# Patient Record
Sex: MALE
[De-identification: names, ages, dates, MRNs, and addresses within clinical notes are randomized per-mention and may not be internally consistent; named-entity substitution may affect disease eponyms.]

---

## 2024-06-05 ENCOUNTER — APPOINTMENT (OUTPATIENT)
Dept: ORTHOPEDIC SURGERY | Facility: CLINIC | Age: 69
End: 2024-06-05

## 2024-06-05 ENCOUNTER — APPOINTMENT (OUTPATIENT)
Dept: ORTHOPEDIC SURGERY | Facility: CLINIC | Age: 69
End: 2024-06-05
Payer: COMMERCIAL

## 2024-06-05 PROBLEM — Z00.00 ENCOUNTER FOR PREVENTIVE HEALTH EXAMINATION: Status: ACTIVE | Noted: 2024-06-05

## 2024-06-05 PROCEDURE — 99203 OFFICE O/P NEW LOW 30 MIN: CPT | Mod: 57

## 2024-06-05 PROCEDURE — 73080 X-RAY EXAM OF ELBOW: CPT | Mod: LT

## 2024-06-05 PROCEDURE — 24650 CLTX RDL HEAD/NCK FX WO MNPJ: CPT

## 2024-06-05 NOTE — IMAGING
[de-identified] : Left elbow Moderately swollen No ecchymosis or wounds Normal muscle tone/ bulk TTP at the radial head/neck Limited ROM: Flexes to 90 degrees, extends to 30 degrees short of full Painful pronosupination No mechanical blocks to motion Full symmetric wrist ROM Able to make a full composite fist +AIN/ PIN/ Ulnar n SILT throughout fingers wwp  3 views left elbow: Nondisplaced radial head fracture

## 2024-06-05 NOTE — HISTORY OF PRESENT ILLNESS
[de-identified] : 06/05/2024 SHY NGUYEN is a 69 year old male here today for: the left elbow  Location: Left elbow Complaint: The patient presents the office today for evaluation of left elbow pain.  The patient notes a mechanical fall while playing [employer] on 6/4/2024, landing directly on his left elbow.  He noted immediate onset of pain and limited range of motion.  Today he continues to note mild pain but denies numbness and tingling.  Of note, the patient has plans to travel to [place] tomorrow. Symptom onset: 6/4/2024 Prior treatments: none  Hand Dominance: right  Occupation:   PMH: none  Allergies: none

## 2024-06-05 NOTE — DISCUSSION/SUMMARY
[de-identified] : - reviewed the nature of this injury and prognosis with the patient and his wife in layman's terms - discussed indications for both operative and nonoperative treatment - reviewed conservative options including the role for bracing, anti-inflammatory medications and therapy - reviewed risks, benefits and alternatives to these - activity modifications, patient instructed on gentle range of motion exercises - sling for comfort - NSAIDs as needed for pain - f/u in 2 weeks for repeat x-rays, may consider PT at that time

## 2024-06-19 ENCOUNTER — APPOINTMENT (OUTPATIENT)
Dept: ORTHOPEDIC SURGERY | Facility: CLINIC | Age: 69
End: 2024-06-19
Payer: COMMERCIAL

## 2024-06-19 DIAGNOSIS — S52.122A DISPLACED FRACTURE OF HEAD OF LEFT RADIUS, INITIAL ENCOUNTER FOR CLOSED FRACTURE: ICD-10-CM

## 2024-06-19 PROCEDURE — 73080 X-RAY EXAM OF ELBOW: CPT | Mod: LT

## 2024-06-19 PROCEDURE — 99024 POSTOP FOLLOW-UP VISIT: CPT

## 2024-06-19 NOTE — HISTORY OF PRESENT ILLNESS
[de-identified] : 6/19/24: The patient returns today for repeat evaluation of his left radial head fracture.  He has been working on his gentle range of motion but remains somewhat stiff.  He returns from his trip and notes that he was bike [place] the countryside in [place].  No new injuries, he has mild pain.  06/05/2024 SHY NGUYEN is a 69 year old male here today for: the left elbow Location: Left elbow Complaint: The patient presents the office today for evaluation of left elbow pain. The patient notes a mechanical fall while playing [employer] on 6/4/2024, landing directly on his left elbow. He noted immediate onset of pain and limited range of motion. Today he continues to note mild pain but denies numbness and tingling. Of note, the patient has plans to travel to [place] tomorrow. Symptom onset: 6/4/2024 Prior treatments: none Hand Dominance: right Occupation:  PMH: none Allergies: none

## 2024-06-19 NOTE — DISCUSSION/SUMMARY
[de-identified] : - again reviewed the nature of this injury and prognosis with the patient and his wife in layman's terms - discussed indications for both operative and nonoperative treatment - reviewed conservative options including the role for bracing, anti-inflammatory medications and therapy - reviewed risks, benefits and alternatives to these - activity modifications, continue gentle range of motion exercises - NSAIDs as needed for pain - PT Rx provided today - f/u in 2 weeks for motion check

## 2024-06-19 NOTE — IMAGING
[de-identified] : Left elbow Mildly swollen No ecchymosis or wounds Normal muscle tone/ bulk TTP at the radial head/neck Limited ROM: Flexes to 110 degrees, extends to 30 degrees short of full Painful pronosupination No mechanical blocks to motion Full symmetric wrist ROM Able to make a full composite fist +AIN/ PIN/ Ulnar n SILT throughout fingers wwp  3 views left elbow: Nondisplaced radial head fracture, no interval displacement

## 2024-07-02 ENCOUNTER — RX RENEWAL (OUTPATIENT)
Age: 69
End: 2024-07-02

## 2024-07-03 ENCOUNTER — APPOINTMENT (OUTPATIENT)
Dept: ORTHOPEDIC SURGERY | Facility: CLINIC | Age: 69
End: 2024-07-03
Payer: COMMERCIAL

## 2024-07-03 DIAGNOSIS — S52.122A DISPLACED FRACTURE OF HEAD OF LEFT RADIUS, INITIAL ENCOUNTER FOR CLOSED FRACTURE: ICD-10-CM

## 2024-07-03 PROCEDURE — 99024 POSTOP FOLLOW-UP VISIT: CPT

## 2024-07-03 PROCEDURE — 73080 X-RAY EXAM OF ELBOW: CPT | Mod: LT

## 2024-08-07 ENCOUNTER — APPOINTMENT (OUTPATIENT)
Dept: ORTHOPEDIC SURGERY | Facility: CLINIC | Age: 69
End: 2024-08-07

## 2024-09-17 ENCOUNTER — APPOINTMENT (OUTPATIENT)
Dept: ORTHOPEDIC SURGERY | Facility: CLINIC | Age: 69
End: 2024-09-17
Payer: COMMERCIAL

## 2024-09-17 DIAGNOSIS — S52.122A DISPLACED FRACTURE OF HEAD OF LEFT RADIUS, INITIAL ENCOUNTER FOR CLOSED FRACTURE: ICD-10-CM

## 2024-09-17 DIAGNOSIS — M16.11 UNILATERAL PRIMARY OSTEOARTHRITIS, RIGHT HIP: ICD-10-CM

## 2024-09-17 DIAGNOSIS — R10.30 LOWER ABDOMINAL PAIN, UNSPECIFIED: ICD-10-CM

## 2024-09-17 DIAGNOSIS — Z00.00 ENCOUNTER FOR GENERAL ADULT MEDICAL EXAMINATION W/OUT ABNORMAL FINDINGS: ICD-10-CM

## 2024-09-17 PROCEDURE — 73503 X-RAY EXAM HIP UNI 4/> VIEWS: CPT | Mod: RT

## 2024-09-17 PROCEDURE — 99214 OFFICE O/P EST MOD 30 MIN: CPT

## 2024-09-17 PROCEDURE — 73080 X-RAY EXAM OF ELBOW: CPT | Mod: LT

## 2024-09-18 PROBLEM — R10.30 DEEP GROIN PAIN: Noted: 2024-09-17

## 2024-09-18 PROBLEM — M16.11 PRIMARY OSTEOARTHRITIS OF RIGHT HIP: Status: ACTIVE | Noted: 2024-09-18

## 2024-09-18 NOTE — HISTORY OF PRESENT ILLNESS
[de-identified] : 9/17/24: The patient returns today for repeat evaluation of his left radial head fracture.  He has been working with physical therapy and notes significant gains in his range of motion despite some persistent pain and a crunching sensation.  As an aside he notes right groin pain which is associated with activities.  No new injuries, numbness or tingling.  06/05/2024 SHY NGUYEN is a 69 year old male here today for: the left elbow Location: Left elbow Complaint: The patient presents the office today for evaluation of left elbow pain. The patient notes a mechanical fall while playing [email or web address] on 6/4/2024, landing directly on his left elbow. He noted immediate onset of pain and limited range of motion. Today he continues to note mild pain but denies numbness and tingling. Of note, the patient has plans to travel to [place] tomorrow. Symptom onset: 6/4/2024 Prior treatments: none Hand Dominance: right Occupation:  PMH: none Allergies: none

## 2024-09-18 NOTE — IMAGING
[AP] : anteroposterior [Lateral] : lateral [There are no fractures, subluxations or dislocations. No significant abnormalities are seen] : There are no fractures, subluxations or dislocations. No significant abnormalities are seen [Moderate arthritis (Tonnis Grade 2)] : Moderate arthritis (Tonnis Grade 2) [de-identified] : Left elbow No ecchymosis, swelling or wounds Normal muscle tone/ bulk Minimally TTP at the radial head/neck Limited ROM: Flexes to 140 degrees, extends to 20 degrees short of full Symmetric pronosupination  No mechanical blocks to motion No instability Full symmetric wrist ROM Able to make a full composite fist +AIN/ PIN/ Ulnar n SILT throughout fingers wwp  3 views left elbow: Nondisplaced radial head fracture, no interval displacement

## 2024-09-18 NOTE — DISCUSSION/SUMMARY
[de-identified] : Reviewed the nature of all of these findings with the patient and likely etiologies and layman's terms MRI left elbow to evaluate annular and lateral collateral ligaments Right hip radiographs reviewed with the patient -primary osteoarthritis Activity modifications NSAIDs as needed for pain Follow-up with Dr. Smith for right hip Follow-up after MRI

## 2024-09-18 NOTE — HISTORY OF PRESENT ILLNESS
[de-identified] : 9/17/24: The patient returns today for repeat evaluation of his left radial head fracture.  He has been working with physical therapy and notes significant gains in his range of motion despite some persistent pain and a crunching sensation.  As an aside he notes right groin pain which is associated with activities.  No new injuries, numbness or tingling.  06/05/2024 SHY NGUYEN is a 69 year old male here today for: the left elbow Location: Left elbow Complaint: The patient presents the office today for evaluation of left elbow pain. The patient notes a mechanical fall while playing [employer] on 6/4/2024, landing directly on his left elbow. He noted immediate onset of pain and limited range of motion. Today he continues to note mild pain but denies numbness and tingling. Of note, the patient has plans to travel to [place] tomorrow. Symptom onset: 6/4/2024 Prior treatments: none Hand Dominance: right Occupation:  PMH: none Allergies: none

## 2024-09-18 NOTE — IMAGING
[AP] : anteroposterior [Lateral] : lateral [There are no fractures, subluxations or dislocations. No significant abnormalities are seen] : There are no fractures, subluxations or dislocations. No significant abnormalities are seen [Moderate arthritis (Tonnis Grade 2)] : Moderate arthritis (Tonnis Grade 2) [de-identified] : Left elbow No ecchymosis, swelling or wounds Normal muscle tone/ bulk Minimally TTP at the radial head/neck Limited ROM: Flexes to 140 degrees, extends to 20 degrees short of full Symmetric pronosupination  No mechanical blocks to motion No instability Full symmetric wrist ROM Able to make a full composite fist +AIN/ PIN/ Ulnar n SILT throughout fingers wwp  3 views left elbow: Nondisplaced radial head fracture, no interval displacement

## 2024-09-18 NOTE — DISCUSSION/SUMMARY
[de-identified] : Reviewed the nature of all of these findings with the patient and likely etiologies and layman's terms MRI left elbow to evaluate annular and lateral collateral ligaments Right hip radiographs reviewed with the patient -primary osteoarthritis Activity modifications NSAIDs as needed for pain Follow-up with Dr. Smith for right hip Follow-up after MRI

## 2024-09-20 ENCOUNTER — APPOINTMENT (OUTPATIENT)
Dept: MRI IMAGING | Facility: CLINIC | Age: 69
End: 2024-09-20

## 2024-09-20 PROCEDURE — 73221 MRI JOINT UPR EXTREM W/O DYE: CPT | Mod: LT

## 2024-10-01 ENCOUNTER — APPOINTMENT (OUTPATIENT)
Dept: ORTHOPEDIC SURGERY | Facility: CLINIC | Age: 69
End: 2024-10-01
Payer: COMMERCIAL

## 2024-10-01 DIAGNOSIS — M25.322 OTHER INSTABILITY, LEFT ELBOW: ICD-10-CM

## 2024-10-01 DIAGNOSIS — S52.122A DISPLACED FRACTURE OF HEAD OF LEFT RADIUS, INITIAL ENCOUNTER FOR CLOSED FRACTURE: ICD-10-CM

## 2024-10-01 PROCEDURE — 99214 OFFICE O/P EST MOD 30 MIN: CPT

## 2024-10-07 PROBLEM — M25.322 INSTABILITY OF LEFT ELBOW JOINT: Status: ACTIVE | Noted: 2024-10-07

## 2024-10-07 NOTE — HISTORY OF PRESENT ILLNESS
[de-identified] : 9/17/24: The patient returns today for repeat evaluation of his left radial head fracture treated nonoperatively.  He has been working with physical therapy and continues to note small gains in his range of motion despite some persistent pain and a crunching sensation. No new injuries, numbness or tingling.  He has an MRI available for review today.  06/05/2024 SHY NGUYEN is a 69 year old male here today for: the left elbow Location: Left elbow Complaint: The patient presents the office today for evaluation of left elbow pain. The patient notes a mechanical fall while playing [employer] on 6/4/2024, landing directly on his left elbow. He noted immediate onset of pain and limited range of motion. Today he continues to note mild pain but denies numbness and tingling. Of note, the patient has plans to travel to [place] tomorrow. Symptom onset: 6/4/2024 Prior treatments: none Hand Dominance: right Occupation:  PMH: none Allergies: none

## 2024-10-07 NOTE — HISTORY OF PRESENT ILLNESS
[de-identified] : 9/17/24: The patient returns today for repeat evaluation of his left radial head fracture treated nonoperatively.  He has been working with physical therapy and continues to note small gains in his range of motion despite some persistent pain and a crunching sensation. No new injuries, numbness or tingling.  He has an MRI available for review today.  06/05/2024 SHY NGUYEN is a 69 year old male here today for: the left elbow Location: Left elbow Complaint: The patient presents the office today for evaluation of left elbow pain. The patient notes a mechanical fall while playing [employer] on 6/4/2024, landing directly on his left elbow. He noted immediate onset of pain and limited range of motion. Today he continues to note mild pain but denies numbness and tingling. Of note, the patient has plans to travel to [place] tomorrow. Symptom onset: 6/4/2024 Prior treatments: none Hand Dominance: right Occupation:  PMH: none Allergies: none

## 2024-10-07 NOTE — DISCUSSION/SUMMARY
[de-identified] : Reviewed the nature of all of these findings with the patient and etiology in layman's terms MRI images and findings reviewed in depth Reviewed indications for operative and nonoperative treatments Reviewed nonoperative treatments including the role for anti-inflammatory medications, bracing and therapy Reviewed operative procedure including LUCL reconstruction and the postoperative expectations At this time he would like to seek a second opinion which I agree is reasonable Activity modifications NSAIDs as needed for pain Follow-up as needed

## 2024-10-07 NOTE — IMAGING
[de-identified] : Left elbow No ecchymosis, swelling or wounds Normal muscle tone/ bulk Minimally TTP at the radial head/neck Limited ROM: Flexes to 140 degrees, extends to 20 degrees short of full Symmetric pronosupination  No mechanical blocks to motion No gross instability - lateral pivot shift test Full symmetric wrist ROM Able to make a full composite fist +AIN/ PIN/ Ulnar n SILT throughout fingers wwp  3 views left elbow: Nondisplaced radial head fracture, no interval displacement MRI left elbow (9/20/24):  nondisplaced, nondepressed fracture of the volar radial head with marrow edema and nondepressed fracture of the posterior capitellum with marrow edema.  Common flexor tendinopathy and fraying with low-grade tear at humeral origin.  High-grade essentially complete tear of the lateral collateral ligament at the humeral origin with high-grade tear of the extensor carpi radialis brevis and moderate grade tear of the extensor carpi radialis longus at the humeral origin.  Arthrosis with joint effusion.  Ulnar subluxation of the ulnar nerve.

## 2024-10-07 NOTE — IMAGING
[de-identified] : Left elbow No ecchymosis, swelling or wounds Normal muscle tone/ bulk Minimally TTP at the radial head/neck Limited ROM: Flexes to 140 degrees, extends to 20 degrees short of full Symmetric pronosupination  No mechanical blocks to motion No gross instability - lateral pivot shift test Full symmetric wrist ROM Able to make a full composite fist +AIN/ PIN/ Ulnar n SILT throughout fingers wwp  3 views left elbow: Nondisplaced radial head fracture, no interval displacement MRI left elbow (9/20/24):  nondisplaced, nondepressed fracture of the volar radial head with marrow edema and nondepressed fracture of the posterior capitellum with marrow edema.  Common flexor tendinopathy and fraying with low-grade tear at humeral origin.  High-grade essentially complete tear of the lateral collateral ligament at the humeral origin with high-grade tear of the extensor carpi radialis brevis and moderate grade tear of the extensor carpi radialis longus at the humeral origin.  Arthrosis with joint effusion.  Ulnar subluxation of the ulnar nerve.

## 2024-10-07 NOTE — DISCUSSION/SUMMARY
[de-identified] : Reviewed the nature of all of these findings with the patient and etiology in layman's terms MRI images and findings reviewed in depth Reviewed indications for operative and nonoperative treatments Reviewed nonoperative treatments including the role for anti-inflammatory medications, bracing and therapy Reviewed operative procedure including LUCL reconstruction and the postoperative expectations At this time he would like to seek a second opinion which I agree is reasonable Activity modifications NSAIDs as needed for pain Follow-up as needed

## 2024-10-11 ENCOUNTER — APPOINTMENT (OUTPATIENT)
Dept: ORTHOPEDIC SURGERY | Facility: CLINIC | Age: 69
End: 2024-10-11
Payer: COMMERCIAL

## 2024-10-11 VITALS — WEIGHT: 150 LBS | BODY MASS INDEX: 24.99 KG/M2 | HEIGHT: 65 IN

## 2024-10-11 DIAGNOSIS — M16.11 UNILATERAL PRIMARY OSTEOARTHRITIS, RIGHT HIP: ICD-10-CM

## 2024-10-11 DIAGNOSIS — E78.00 PURE HYPERCHOLESTEROLEMIA, UNSPECIFIED: ICD-10-CM

## 2024-10-11 DIAGNOSIS — I10 ESSENTIAL (PRIMARY) HYPERTENSION: ICD-10-CM

## 2024-10-11 PROCEDURE — 99205 OFFICE O/P NEW HI 60 MIN: CPT

## 2024-11-01 ENCOUNTER — APPOINTMENT (OUTPATIENT)
Dept: ORTHOPEDIC SURGERY | Facility: CLINIC | Age: 69
End: 2024-11-01
Payer: COMMERCIAL

## 2024-11-01 VITALS — WEIGHT: 170 LBS | HEIGHT: 65 IN | BODY MASS INDEX: 28.32 KG/M2

## 2024-11-01 DIAGNOSIS — Z86.79 PERSONAL HISTORY OF OTHER DISEASES OF THE CIRCULATORY SYSTEM: ICD-10-CM

## 2024-11-01 DIAGNOSIS — M16.11 UNILATERAL PRIMARY OSTEOARTHRITIS, RIGHT HIP: ICD-10-CM

## 2024-11-01 PROCEDURE — 73502 X-RAY EXAM HIP UNI 2-3 VIEWS: CPT

## 2024-11-01 PROCEDURE — G2211 COMPLEX E/M VISIT ADD ON: CPT | Mod: NC

## 2024-11-01 PROCEDURE — 99214 OFFICE O/P EST MOD 30 MIN: CPT

## 2024-11-01 RX ORDER — LOSARTAN POTASSIUM 100 MG/1
100 TABLET, FILM COATED ORAL
Refills: 0 | Status: ACTIVE | COMMUNITY

## 2024-11-05 ENCOUNTER — NON-APPOINTMENT (OUTPATIENT)
Age: 69
End: 2024-11-05

## 2024-11-05 ENCOUNTER — OUTPATIENT (OUTPATIENT)
Dept: OUTPATIENT SERVICES | Facility: HOSPITAL | Age: 69
LOS: 1 days | End: 2024-11-05
Payer: COMMERCIAL

## 2024-11-05 VITALS
WEIGHT: 173.94 LBS | HEIGHT: 64.5 IN | HEART RATE: 70 BPM | TEMPERATURE: 98 F | DIASTOLIC BLOOD PRESSURE: 79 MMHG | OXYGEN SATURATION: 99 % | SYSTOLIC BLOOD PRESSURE: 130 MMHG | RESPIRATION RATE: 14 BRPM

## 2024-11-05 DIAGNOSIS — H35.373 PUCKERING OF MACULA, BILATERAL: Chronic | ICD-10-CM

## 2024-11-05 DIAGNOSIS — M16.11 UNILATERAL PRIMARY OSTEOARTHRITIS, RIGHT HIP: ICD-10-CM

## 2024-11-05 DIAGNOSIS — Z01.818 ENCOUNTER FOR OTHER PREPROCEDURAL EXAMINATION: ICD-10-CM

## 2024-11-05 DIAGNOSIS — Z98.890 OTHER SPECIFIED POSTPROCEDURAL STATES: Chronic | ICD-10-CM

## 2024-11-05 DIAGNOSIS — Z98.41 CATARACT EXTRACTION STATUS, RIGHT EYE: Chronic | ICD-10-CM

## 2024-11-05 LAB
ALBUMIN SERPL ELPH-MCNC: 3.7 G/DL — SIGNIFICANT CHANGE UP (ref 3.3–5)
ALP SERPL-CCNC: 112 U/L — SIGNIFICANT CHANGE UP (ref 30–120)
ALT FLD-CCNC: 26 U/L — SIGNIFICANT CHANGE UP (ref 10–60)
ANION GAP SERPL CALC-SCNC: 8 MMOL/L — SIGNIFICANT CHANGE UP (ref 5–17)
APTT BLD: 33.6 SEC — SIGNIFICANT CHANGE UP (ref 24.5–35.6)
AST SERPL-CCNC: 21 U/L — SIGNIFICANT CHANGE UP (ref 10–40)
BILIRUB SERPL-MCNC: 0.7 MG/DL — SIGNIFICANT CHANGE UP (ref 0.2–1.2)
BLD GP AB SCN SERPL QL: SIGNIFICANT CHANGE UP
BUN SERPL-MCNC: 25 MG/DL — HIGH (ref 7–23)
CALCIUM SERPL-MCNC: 9.2 MG/DL — SIGNIFICANT CHANGE UP (ref 8.4–10.5)
CHLORIDE SERPL-SCNC: 102 MMOL/L — SIGNIFICANT CHANGE UP (ref 96–108)
CO2 SERPL-SCNC: 30 MMOL/L — SIGNIFICANT CHANGE UP (ref 22–31)
CREAT SERPL-MCNC: 1.58 MG/DL — HIGH (ref 0.5–1.3)
EGFR: 47 ML/MIN/1.73M2 — LOW
GLUCOSE SERPL-MCNC: 96 MG/DL — SIGNIFICANT CHANGE UP (ref 70–99)
HCT VFR BLD CALC: 49.7 % — SIGNIFICANT CHANGE UP (ref 39–50)
HGB BLD-MCNC: 17.2 G/DL — HIGH (ref 13–17)
INR BLD: 1 RATIO — SIGNIFICANT CHANGE UP (ref 0.85–1.16)
MCHC RBC-ENTMCNC: 27.6 PG — SIGNIFICANT CHANGE UP (ref 27–34)
MCHC RBC-ENTMCNC: 34.6 G/DL — SIGNIFICANT CHANGE UP (ref 32–36)
MCV RBC AUTO: 79.6 FL — LOW (ref 80–100)
NRBC # BLD: 0 /100 WBCS — SIGNIFICANT CHANGE UP (ref 0–0)
PLATELET # BLD AUTO: 248 K/UL — SIGNIFICANT CHANGE UP (ref 150–400)
POTASSIUM SERPL-MCNC: 3.6 MMOL/L — SIGNIFICANT CHANGE UP (ref 3.5–5.3)
POTASSIUM SERPL-SCNC: 3.6 MMOL/L — SIGNIFICANT CHANGE UP (ref 3.5–5.3)
PROT SERPL-MCNC: 7.3 G/DL — SIGNIFICANT CHANGE UP (ref 6–8.3)
PROTHROM AB SERPL-ACNC: 11.6 SEC — SIGNIFICANT CHANGE UP (ref 9.9–13.4)
RBC # BLD: 6.24 M/UL — HIGH (ref 4.2–5.8)
RBC # FLD: 14.3 % — SIGNIFICANT CHANGE UP (ref 10.3–14.5)
SODIUM SERPL-SCNC: 140 MMOL/L — SIGNIFICANT CHANGE UP (ref 135–145)
WBC # BLD: 9.68 K/UL — SIGNIFICANT CHANGE UP (ref 3.8–10.5)
WBC # FLD AUTO: 9.68 K/UL — SIGNIFICANT CHANGE UP (ref 3.8–10.5)

## 2024-11-05 PROCEDURE — 93010 ELECTROCARDIOGRAM REPORT: CPT

## 2024-11-05 PROCEDURE — 86900 BLOOD TYPING SEROLOGIC ABO: CPT

## 2024-11-05 PROCEDURE — 85730 THROMBOPLASTIN TIME PARTIAL: CPT

## 2024-11-05 PROCEDURE — 36415 COLL VENOUS BLD VENIPUNCTURE: CPT

## 2024-11-05 PROCEDURE — G0463: CPT

## 2024-11-05 PROCEDURE — 87641 MR-STAPH DNA AMP PROBE: CPT

## 2024-11-05 PROCEDURE — 80053 COMPREHEN METABOLIC PANEL: CPT

## 2024-11-05 PROCEDURE — 86850 RBC ANTIBODY SCREEN: CPT

## 2024-11-05 PROCEDURE — 85610 PROTHROMBIN TIME: CPT

## 2024-11-05 PROCEDURE — 87640 STAPH A DNA AMP PROBE: CPT

## 2024-11-05 PROCEDURE — 86901 BLOOD TYPING SEROLOGIC RH(D): CPT

## 2024-11-05 PROCEDURE — 85027 COMPLETE CBC AUTOMATED: CPT

## 2024-11-05 PROCEDURE — 93005 ELECTROCARDIOGRAM TRACING: CPT

## 2024-11-05 PROCEDURE — 83036 HEMOGLOBIN GLYCOSYLATED A1C: CPT

## 2024-11-05 NOTE — H&P PST ADULT - NSANTHOSAYNRD_GEN_A_CORE
No. AGGIE screening performed.  STOP BANG Legend: 0-2 = LOW Risk; 3-4 = INTERMEDIATE Risk; 5-8 = HIGH Risk

## 2024-11-05 NOTE — H&P PST ADULT - AS BP NONINV METHOD
Quality 130: Documentation Of Current Medications In The Medical Record: Current Medications Documented
Quality 431: Preventive Care And Screening: Unhealthy Alcohol Use - Screening: Patient not identified as an unhealthy alcohol user when screened for unhealthy alcohol use using a systematic screening method
Quality 47: Advance Care Plan: Advance Care Planning discussed and documented; advance care plan or surrogate decision maker documented in the medical record.
Detail Level: Detailed
Quality 110: Preventive Care And Screening: Influenza Immunization: Influenza immunization was not ordered or administered, reason not given
Quality 226: Preventive Care And Screening: Tobacco Use: Screening And Cessation Intervention: Tobacco Screening not Performed
electronic

## 2024-11-05 NOTE — H&P PST ADULT - PROBLEM SELECTOR PLAN 1
scheduled for right hip replacement scheduled for right hip replacement 11/18/24  will obtain medical clearance   pre op instructions on wash and medications

## 2024-11-05 NOTE — H&P PST ADULT - HISTORY OF PRESENT ILLNESS
68 y/o male with 4 year history of worsening right hip pain and limping , Report worse pain at night while sleeping at affected side  , standing for prolonged time , pain scale 7/10 . scheduled for right hip replacement  70 y/o male with 4 year history of worsening right hip pain and limping , Report worse pain at night while sleeping at affected side  standing for prolonged time , pain scale 7/10 . scheduled for right hip replacement 11/18/24

## 2024-11-05 NOTE — H&P PST ADULT - NSICDXPASTMEDICALHX_GEN_ALL_CORE_FT
PAST MEDICAL HISTORY:  HLD (hyperlipidemia)     HTN (hypertension)     Osteoarthritis of right hip

## 2024-11-05 NOTE — H&P PST ADULT - NSICDXPASTSURGICALHX_GEN_ALL_CORE_FT
PAST SURGICAL HISTORY:  Macular pucker, bilateral     S/P arthroscopy of right shoulder     S/P correction of deviated nasal septum     Status post laser cataract surgery of both eyes

## 2024-11-05 NOTE — H&P PST ADULT - NSWEIGHTCALCTOOLDRUG_GEN_A_CORE
----- Message from Bernardo Allen sent at 3/1/2019 10:54 AM CST -----  Contact: Lindy/Dr. Janet Kohli's offie  Lindy called in regarding the attached patient.  Lindy stated she received some information on the attached patient.  Lindy stated it is not actually a referral and received patient medication list.  Lindy stated she needs to speak to nurse about some clinical information & demographics.    Lindy's call back number is [phone]    used

## 2024-11-06 LAB
A1C WITH ESTIMATED AVERAGE GLUCOSE RESULT: 5.6 % — SIGNIFICANT CHANGE UP (ref 4–5.6)
ESTIMATED AVERAGE GLUCOSE: 114 MG/DL — SIGNIFICANT CHANGE UP (ref 68–114)
MRSA PCR RESULT.: SIGNIFICANT CHANGE UP
S AUREUS DNA NOSE QL NAA+PROBE: SIGNIFICANT CHANGE UP

## 2024-11-12 ENCOUNTER — APPOINTMENT (OUTPATIENT)
Dept: ORTHOPEDIC SURGERY | Facility: HOSPITAL | Age: 69
End: 2024-11-12

## 2024-11-18 ENCOUNTER — INPATIENT (INPATIENT)
Facility: HOSPITAL | Age: 69
LOS: 0 days | Discharge: HOME | DRG: 554 | End: 2024-11-19
Attending: ORTHOPAEDIC SURGERY | Admitting: ORTHOPAEDIC SURGERY
Payer: COMMERCIAL

## 2024-11-18 ENCOUNTER — TRANSCRIPTION ENCOUNTER (OUTPATIENT)
Age: 69
End: 2024-11-18

## 2024-11-18 ENCOUNTER — APPOINTMENT (OUTPATIENT)
Dept: ORTHOPEDIC SURGERY | Facility: HOSPITAL | Age: 69
End: 2024-11-18
Payer: COMMERCIAL

## 2024-11-18 ENCOUNTER — NON-APPOINTMENT (OUTPATIENT)
Age: 69
End: 2024-11-18

## 2024-11-18 VITALS
RESPIRATION RATE: 18 BRPM | HEART RATE: 76 BPM | SYSTOLIC BLOOD PRESSURE: 143 MMHG | HEIGHT: 65 IN | OXYGEN SATURATION: 95 % | TEMPERATURE: 98 F | WEIGHT: 170.2 LBS | DIASTOLIC BLOOD PRESSURE: 86 MMHG

## 2024-11-18 DIAGNOSIS — Z98.41 CATARACT EXTRACTION STATUS, RIGHT EYE: Chronic | ICD-10-CM

## 2024-11-18 DIAGNOSIS — Z98.890 OTHER SPECIFIED POSTPROCEDURAL STATES: Chronic | ICD-10-CM

## 2024-11-18 DIAGNOSIS — H35.373 PUCKERING OF MACULA, BILATERAL: Chronic | ICD-10-CM

## 2024-11-18 DIAGNOSIS — M16.11 UNILATERAL PRIMARY OSTEOARTHRITIS, RIGHT HIP: ICD-10-CM

## 2024-11-18 DIAGNOSIS — Z01.818 ENCOUNTER FOR OTHER PREPROCEDURAL EXAMINATION: ICD-10-CM

## 2024-11-18 LAB — ABO RH CONFIRMATION: SIGNIFICANT CHANGE UP

## 2024-11-18 PROCEDURE — 20985 CPTR-ASST DIR MS PX: CPT | Mod: AS

## 2024-11-18 PROCEDURE — 27130 TOTAL HIP ARTHROPLASTY: CPT | Mod: RT

## 2024-11-18 PROCEDURE — 27130 TOTAL HIP ARTHROPLASTY: CPT | Mod: AS,RT

## 2024-11-18 PROCEDURE — 73502 X-RAY EXAM HIP UNI 2-3 VIEWS: CPT | Mod: 26,RT

## 2024-11-18 PROCEDURE — 20985 CPTR-ASST DIR MS PX: CPT

## 2024-11-18 PROCEDURE — 99222 1ST HOSP IP/OBS MODERATE 55: CPT

## 2024-11-18 DEVICE — POLARSTEM FEM COLLAR LAT TI HA 2 126 DEG 12/14 132MM: Type: IMPLANTABLE DEVICE | Site: RIGHT | Status: FUNCTIONAL

## 2024-11-18 DEVICE — HEAD TAPER FEMORAL 28MM OXINIUM: Type: IMPLANTABLE DEVICE | Site: RIGHT | Status: FUNCTIONAL

## 2024-11-18 DEVICE — SCREW PELVIC G2 STD 116MM: Type: IMPLANTABLE DEVICE | Site: RIGHT | Status: FUNCTIONAL

## 2024-11-18 DEVICE — INSERT ACET OR3O DM XLPE 28/42: Type: IMPLANTABLE DEVICE | Site: RIGHT | Status: FUNCTIONAL

## 2024-11-18 DEVICE — SHELL ACET R3 POROUS STD 3 HOLE 54MM: Type: IMPLANTABLE DEVICE | Site: RIGHT | Status: FUNCTIONAL

## 2024-11-18 DEVICE — LINER ACET OR3O DUAL MOBILITY 42/54: Type: IMPLANTABLE DEVICE | Site: RIGHT | Status: FUNCTIONAL

## 2024-11-18 RX ORDER — CEFAZOLIN SODIUM 10 G
2000 VIAL (EA) INJECTION EVERY 8 HOURS
Refills: 0 | Status: COMPLETED | OUTPATIENT
Start: 2024-11-18 | End: 2024-11-19

## 2024-11-18 RX ORDER — 0.9 % SODIUM CHLORIDE 0.9 %
1000 INTRAVENOUS SOLUTION INTRAVENOUS
Refills: 0 | Status: DISCONTINUED | OUTPATIENT
Start: 2024-11-18 | End: 2024-11-19

## 2024-11-18 RX ORDER — 0.9 % SODIUM CHLORIDE 0.9 %
1000 INTRAVENOUS SOLUTION INTRAVENOUS
Refills: 0 | Status: DISCONTINUED | OUTPATIENT
Start: 2024-11-18 | End: 2024-11-18

## 2024-11-18 RX ORDER — OXYCODONE HYDROCHLORIDE 30 MG/1
10 TABLET ORAL
Refills: 0 | Status: DISCONTINUED | OUTPATIENT
Start: 2024-11-18 | End: 2024-11-19

## 2024-11-18 RX ORDER — APREPITANT 40 MG/1
40 CAPSULE ORAL ONCE
Refills: 0 | Status: COMPLETED | OUTPATIENT
Start: 2024-11-18 | End: 2024-11-18

## 2024-11-18 RX ORDER — ACETAMINOPHEN 500MG 500 MG/1
1000 TABLET, COATED ORAL EVERY 8 HOURS
Refills: 0 | Status: DISCONTINUED | OUTPATIENT
Start: 2024-11-18 | End: 2024-11-19

## 2024-11-18 RX ORDER — CEFAZOLIN SODIUM 10 G
2000 VIAL (EA) INJECTION ONCE
Refills: 0 | Status: COMPLETED | OUTPATIENT
Start: 2024-11-18 | End: 2024-11-18

## 2024-11-18 RX ORDER — AMLODIPINE BESYLATE 10 MG/1
10 TABLET ORAL DAILY
Refills: 0 | Status: DISCONTINUED | OUTPATIENT
Start: 2024-11-20 | End: 2024-11-19

## 2024-11-18 RX ORDER — HYDROMORPHONE HYDROCHLORIDE 2 MG/1
0.25 TABLET ORAL
Refills: 0 | Status: DISCONTINUED | OUTPATIENT
Start: 2024-11-18 | End: 2024-11-18

## 2024-11-18 RX ORDER — LOSARTAN POTASSIUM 100 MG/1
100 TABLET, FILM COATED ORAL DAILY
Refills: 0 | Status: DISCONTINUED | OUTPATIENT
Start: 2024-11-20 | End: 2024-11-19

## 2024-11-18 RX ORDER — PANTOPRAZOLE SODIUM 40 MG/1
40 TABLET, DELAYED RELEASE ORAL
Refills: 0 | Status: DISCONTINUED | OUTPATIENT
Start: 2024-11-18 | End: 2024-11-19

## 2024-11-18 RX ORDER — POLYETHYLENE GLYCOL 3350 17 G/17G
17 POWDER, FOR SOLUTION ORAL AT BEDTIME
Refills: 0 | Status: DISCONTINUED | OUTPATIENT
Start: 2024-11-18 | End: 2024-11-19

## 2024-11-18 RX ORDER — AMLODIPINE AND ATORVASTATIN 5; 20 MG/1; MG/1
1 TABLET, COATED ORAL
Refills: 0 | DISCHARGE

## 2024-11-18 RX ORDER — ONDANSETRON HYDROCHLORIDE 4 MG/1
4 TABLET, FILM COATED ORAL ONCE
Refills: 0 | Status: DISCONTINUED | OUTPATIENT
Start: 2024-11-18 | End: 2024-11-18

## 2024-11-18 RX ORDER — ACETAMINOPHEN 500MG 500 MG/1
1000 TABLET, COATED ORAL ONCE
Refills: 0 | Status: COMPLETED | OUTPATIENT
Start: 2024-11-18 | End: 2024-11-18

## 2024-11-18 RX ORDER — TRANEXAMIC ACID 100 MG/ML
770 INJECTION INTRAVENOUS ONCE
Refills: 0 | Status: DISCONTINUED | OUTPATIENT
Start: 2024-11-18 | End: 2024-11-18

## 2024-11-18 RX ORDER — DEXAMETHASONE 1.5 MG/1
8 TABLET ORAL ONCE
Refills: 0 | Status: COMPLETED | OUTPATIENT
Start: 2024-11-19 | End: 2024-11-19

## 2024-11-18 RX ORDER — OXYCODONE HYDROCHLORIDE 30 MG/1
5 TABLET ORAL
Refills: 0 | Status: DISCONTINUED | OUTPATIENT
Start: 2024-11-18 | End: 2024-11-19

## 2024-11-18 RX ORDER — MAGNESIUM, ALUMINUM HYDROXIDE 200-225/5
30 SUSPENSION, ORAL (FINAL DOSE FORM) ORAL
Refills: 0 | Status: DISCONTINUED | OUTPATIENT
Start: 2024-11-18 | End: 2024-11-19

## 2024-11-18 RX ORDER — CHLORHEXIDINE GLUCONATE 1.2 MG/ML
1 RINSE ORAL ONCE
Refills: 0 | Status: COMPLETED | OUTPATIENT
Start: 2024-11-18 | End: 2024-11-18

## 2024-11-18 RX ORDER — OXYCODONE HYDROCHLORIDE 30 MG/1
5 TABLET ORAL ONCE
Refills: 0 | Status: DISCONTINUED | OUTPATIENT
Start: 2024-11-18 | End: 2024-11-18

## 2024-11-18 RX ORDER — SENNOSIDES 8.6 MG
2 TABLET ORAL AT BEDTIME
Refills: 0 | Status: DISCONTINUED | OUTPATIENT
Start: 2024-11-18 | End: 2024-11-19

## 2024-11-18 RX ORDER — HYDROMORPHONE HYDROCHLORIDE 2 MG/1
0.5 TABLET ORAL
Refills: 0 | Status: DISCONTINUED | OUTPATIENT
Start: 2024-11-18 | End: 2024-11-19

## 2024-11-18 RX ORDER — LOSARTAN POTASSIUM AND HYDROCHLOROTHIAZIDE 50; 12.5 MG/1; MG/1
1 TABLET, FILM COATED ORAL
Refills: 0 | DISCHARGE

## 2024-11-18 RX ORDER — HYDROMORPHONE HYDROCHLORIDE 2 MG/1
0.5 TABLET ORAL
Refills: 0 | Status: DISCONTINUED | OUTPATIENT
Start: 2024-11-18 | End: 2024-11-18

## 2024-11-18 RX ORDER — ONDANSETRON HYDROCHLORIDE 4 MG/1
4 TABLET, FILM COATED ORAL EVERY 6 HOURS
Refills: 0 | Status: DISCONTINUED | OUTPATIENT
Start: 2024-11-18 | End: 2024-11-19

## 2024-11-18 RX ADMIN — HYDROMORPHONE HYDROCHLORIDE 0.25 MILLIGRAM(S): 2 TABLET ORAL at 11:39

## 2024-11-18 RX ADMIN — Medication 100 MILLIGRAM(S): at 16:25

## 2024-11-18 RX ADMIN — HYDROMORPHONE HYDROCHLORIDE 0.5 MILLIGRAM(S): 2 TABLET ORAL at 11:46

## 2024-11-18 RX ADMIN — APREPITANT 40 MILLIGRAM(S): 40 CAPSULE ORAL at 07:39

## 2024-11-18 RX ADMIN — Medication 10 MILLIGRAM(S): at 22:07

## 2024-11-18 RX ADMIN — CHLORHEXIDINE GLUCONATE 1 APPLICATION(S): 1.2 RINSE ORAL at 07:39

## 2024-11-18 RX ADMIN — HYDROMORPHONE HYDROCHLORIDE 0.5 MILLIGRAM(S): 2 TABLET ORAL at 11:56

## 2024-11-18 RX ADMIN — Medication 2 TABLET(S): at 22:07

## 2024-11-18 RX ADMIN — ACETAMINOPHEN 500MG 1000 MILLIGRAM(S): 500 TABLET, COATED ORAL at 22:10

## 2024-11-18 RX ADMIN — Medication 75 MILLILITER(S): at 11:28

## 2024-11-18 RX ADMIN — ACETAMINOPHEN 500MG 400 MILLIGRAM(S): 500 TABLET, COATED ORAL at 16:25

## 2024-11-18 RX ADMIN — Medication 125 MILLILITER(S): at 22:07

## 2024-11-18 RX ADMIN — HYDROMORPHONE HYDROCHLORIDE 0.25 MILLIGRAM(S): 2 TABLET ORAL at 11:31

## 2024-11-18 RX ADMIN — ACETAMINOPHEN 500MG 1000 MILLIGRAM(S): 500 TABLET, COATED ORAL at 22:06

## 2024-11-18 NOTE — BRIEF OPERATIVE NOTE - SECOND ASSIST PARTICIPATION DETAILS - (COMPONENTS OF THE PROCEDURE THAT ASSISTANT PARTICIPATED IN)
I acted within this role throughout the entirety of the procedure performed by the primary surgeon + Artemio HAYWARD

## 2024-11-18 NOTE — DISCHARGE NOTE PROVIDER - NSDCHHNEEDSERVICE_GEN_ALL_CORE
Observation and assessment/Rehabilitation services/Teaching and training Observation and assessment/Rehabilitation services/Teaching and training/Wound care and assessment

## 2024-11-18 NOTE — BRIEF OPERATIVE NOTE - NSICDXBRIEFPREOP_GEN_ALL_CORE_FT
PRE-OP DIAGNOSIS:  Primary localized osteoarthritis of right hip 18-Nov-2024 07:25:35  Artemio Joshi

## 2024-11-18 NOTE — PRE-OP CHECKLIST - TEMPERATURE IN FAHRENHEIT (DEGREES F)
Patient has been rescheduled from 1/18/24 to 1/23/24 at 1030.  Reason: appointment no longer available  Message sent to Endo  to update.    97.9

## 2024-11-18 NOTE — DISCHARGE NOTE PROVIDER - HOSPITAL COURSE
This is 69y Male patient was admitted to [facility] with a history Primary localized osteoarthritis of right hip.  Patient went to Pre-Surgical Testing at [facility] and was medically cleared to undergo elective procedure.    The patient underwent a Right total hip arthroplasty by Sundar Rodríguez on 11-18-24.   No operative or arun-operative complications arose during patients hospital course.    Patient received antibiotic according to SCIP guidelines for infection prevention.   Aspirin was given for DVT prophylaxis.    Anesthesia, Medical Hospitalist, Physical Therapy and Occupational Therapy were consulted. Patient is stable for discharge with a good prognosis.  Appropriate discharge instructions and medications are provided in this document. This is 69y Male patient was admitted to [facility] with a history Primary localized osteoarthritis of right hip not responding to conservative Tx.  Patient went to Pre-Surgical Testing at [facility] and was medically cleared to undergo elective procedure.    The patient underwent a Right total hip arthroplasty by Sundar Rodríguez on 11-18-24 under regional anesthesia.   No operative or arun-operative complications arose during patients hospital course. No transfusions.  Patient received antibiotic according to SCIP guidelines for infection prevention.   Aspirin was given for DVT prophylaxis per Caprini Risk score.    Anesthesia, Medical Hospitalist, Physical Therapy and Occupational Therapy were consulted. Labs followed by Ortho team and Hospitalist.  Clean RAJENDRA dressing, NO Sx of SSI. Stable Neurovascular exam of LE.  Patient is stable for Home discharge with Home Care / Home PT with a good prognosis.  Appropriate discharge instructions and medications, ORtho F/U, Medical F/U were reviewed with the patient in detail are provided in this document.

## 2024-11-18 NOTE — CARE COORDINATION ASSESSMENT. - NSCAREPROVIDERS_GEN_ALL_CORE_FT
CARE PROVIDERS:  Admitting: Sundar Rodríguez  Attending: Sundar Rodríguez  Case Management: Bella Roach  Consultant: Pallack, Robyn  Covering Team: Koffi Marino  Covering Team: Eduardo Hernández  Nurse: Rosalba Vilchis  Nurse: Azael Pemberton  Nurse: Laurie Horta  Nurse: Lorena Romeo  Nurse: Taylor Choe  Nurse: Lula Sherman  Nurse: Nury Henry  Occupational Therapy: Tj Lewis  Occupational Therapy: Mariia Esqueda  Ordered: Amber Dickens  Override: Taylor Choe  Physical Therapy: Rajinder Toro  Physical Therapy: Shin Stinson  Physical Therapy: Delicia Echeverria  Primary Team: Franklin Wray  Primary Team: Artemio Joshi  Team: LEANDRA  Hospitalists, Team  UR// Supp. Assoc.: Hattie Kenny  UR// Supp. Assoc.: Arelis Ram

## 2024-11-18 NOTE — DISCHARGE NOTE NURSING/CASE MANAGEMENT/SOCIAL WORK - PATIENT PORTAL LINK FT
You can access the FollowMyHealth Patient Portal offered by [facility] by registering at the following website: [email or web address]. By joining [facility]’s FollowMyHealth portal, you will also be able to view your health information using other applications (apps) compatible with our system.

## 2024-11-18 NOTE — CARE COORDINATION ASSESSMENT. - NSDCPLANSERVICES_GEN_ALL_CORE
CM met with patient at bedside.  Patient. A&O x 4. Pt s/p  PROCEDURES: Total posterior RT hip replacement.   Pt  resting comfortably / Pt has no complaints at this time.  CM explained role of CM and availability to assist with discharge planning throughout stay.   Provided CM contact information and pt. verbalized understanding. Patient lives in a private house with his wife, 2 steps to enter and 14 steps with HR to second floor. Prior to surgery patient was ind in adls. Patient identified his wife as his caregiver at home who will assist him during his recuperation and will transport him home and to MD appointments.   Pt. is agreeable with PT/OT's recommendations for d/c plan to home with HC/PT.  CM explained home care expectations, process, insurance provisions and home safety with good understanding.   Offered list of [facility] and pt. chose [facility] at home care agency.  Provided discharge resources folder. CM made referral accordingly.  Informed them of Anticipated  DC date for 11/19/2024 and for SOC 11/20/2024.   Pt verbalizing understanding and in agreement with d/c plans.  DME: Pt has a RW, commode at home. RX for Hip Kit sent to [phone] to process, pt aware that it cost $45 dollars   PCP: DR Gilberto Gautam [phone]  Pt stated he will be receiving meds to bed from [facility] pharmacy prior to DC./Home Care

## 2024-11-18 NOTE — OCCUPATIONAL THERAPY INITIAL EVALUATION ADULT - NSOTDISCHREC_GEN_A_CORE
Pt would benefit from home OT to increase independence with ADLs, IADLs, functional mobility and assess safety in the home environment. Supervision/assist with functional activities prn which pt states wife will provide./Home OT

## 2024-11-18 NOTE — CARE COORDINATION ASSESSMENT. - NSPASTMEDSURGHISTORY_GEN_ALL_CORE_FT
PAST MEDICAL & SURGICAL HISTORY:  Osteoarthritis of right hip      HLD (hyperlipidemia)      HTN (hypertension)      S/P correction of deviated nasal septum      Macular pucker, bilateral      Status post laser cataract surgery of both eyes      S/P arthroscopy of right shoulder

## 2024-11-18 NOTE — OCCUPATIONAL THERAPY INITIAL EVALUATION ADULT - ASSISTIVE DEVICE:SUPINE/SIT, REHAB EVAL
Pt arrives to ED by self. Pt reports pain in lower back and that radiates down left leg. Pt seen PCP for this over last couple months and was told \"there is nothing else we can do. \" Pt ambulatory at triage. bed rails

## 2024-11-18 NOTE — DISCHARGE NOTE NURSING/CASE MANAGEMENT/SOCIAL WORK - NSDCPEFALRISK_GEN_ALL_CORE
For information on Fall & Injury Prevention, visit: [email or web address] OR  [email or web address] OR  https://www.cdc.gov/steadi/patient.html

## 2024-11-18 NOTE — PHYSICAL THERAPY INITIAL EVALUATION ADULT - DID THE PATIENT HAVE SURGERY?
Hospitalist Progress Note      Name:  Alisha Avitia /Age/Sex: 1939  (79 y.o. female)   MRN & CSN:  [id number] & [id number] Admission Date/Time: 2019  6:01 PM   Location:  [address] [place] [address] PCP: Nory Marshall MD         Hospital Day: 4    Assessment and Plan:   Alisha Avitia is a 79 y.o.  female  who presents with Acute on chronic respiratory failure with hypoxia ([facility] [address]    1. Acute on chronic respiratory failure with hypoxia  · Presented with shortness of breath, and hypoxemia  · Afebrile, WBC 12.3  · CT chest with peripheral right upper lobe consolidation  · Being treated for community-acquired pneumonia with ceftriaxone and azithromycin  2. Acute kidney injury:   · Creatinine 1.8 at baseline, 1.4 on admission, now 1.2 after hydration  3. Hyponatremia: Urine sodium low. Expect to improve after hydration  4. Elevated troponin: We will monitor. Recent stress test negative. 5. Rheumatoid arthritis: On long-term prednisone  6. Hypertension   7. Hyperlipidemia  8. Depression continue Cymbalta  9. Fibromyalgia, chronic lewis. PT/OTn syndrome: Continue Cymbalta, Percocet as needed    Diet DIET CARDIAC;   DVT Prophylaxis [x] Lovenox, []  Heparin, [] SCDs, [] Warfarin  [] NOAC     GI Prophylaxis [] PPI,  [x] H2 Blocker,  [] Carafate,  [] Diet/Tube Feeds   Code Status Full Code   MDM [] Low, [x] Moderate,[]  High     History of Present Illness:     Chief Complaint: Acute on chronic respiratory failure with hypoxia ([facility] [address]    She was seen and examined. Still feels weak. Has been coughing but unable to bring up phlegm. Has SOB mostly with ambulation    Ten point ROS reviewed negative, unless as noted above    Objective:        Intake/Output Summary (Last 24 hours) at 2020 1313  Last data filed at 2020 1055  Gross per 24 hour   Intake 575 ml   Output --   Net 575 ml      Vitals:   Vitals:    20 1030   BP: 131/60   Pulse: 100   Resp: 18   Temp: 97.5 °F (36.4 °C)   SpO2: 100%     Physical Exam: GEN Awake female, sitting upright in bed in no apparent distress. Appears given age. EYES Pupils are equally round. No scleral erythema, discharge, or conjunctivitis. HENT Mucous membranes are moist. Oral pharynx without exudates, no evidence of thrush. NECK Supple, no apparent thyromegaly or masses. RESP crackles, right lung field, resolving  CARDIO/VASC S1/S2 auscultated. Regular rate without appreciable murmurs, rubs, or gallops. No JVD or carotid bruits. Peripheral pulses equal bilaterally and palpable. No peripheral edema. GI Abdomen is soft without significant tenderness, masses, or guarding. Bowel sounds are normoactive. Rectal exam deferred. MSK No gross joint deformities. SKIN Normal coloration, warm, dry. NEURO Cranial nerves appear grossly intact, normal speech, no lateralizing weakness. PSYCH Awake, alert, oriented x 4. Affect appropriate.     Medications:   Medications:    diltiazem  120 mg Oral BID    methylPREDNISolone  40 mg Intravenous Q8H    sodium chloride flush  10 mL Intravenous 2 times per day    enoxaparin  40 mg Subcutaneous Daily    ipratropium-albuterol  1 ampule Inhalation Q4H WA    cefTRIAXone (ROCEPHIN) IV  1 g Intravenous Q24H    azithromycin  500 mg Intravenous Q24H    DULoxetine  60 mg Oral Daily    oxyCODONE-acetaminophen  1 tablet Oral 4x Daily    fluticasone  2 spray Each Nostril Daily    teriparatide (recombinant)  20 mcg Subcutaneous Daily    guaiFENesin  600 mg Oral BID    famotidine  20 mg Oral Daily      Infusions:     PRN Meds: sodium chloride flush, 10 mL, PRN  senna, 1 tablet, Daily PRN  ondansetron, 4 mg, Q6H PRN  acetaminophen, 650 mg, Q4H PRN        Recent Labs     12/31/19  0153 01/01/20  1132 01/02/20  0605   WBC 14.0* 14.4* 12.3*   HGB 11.3* 12.1* 11.1*   HCT 36.4* 38.7 36.2*    398 384      Recent Labs     12/31/19  0153 01/01/20  1132 01/02/20  0605   * 127* 130*   K 3.8 3.9 4.3   CL 91* 89* 95*   CO2 24 23 22   BUN 32* 37* 39* CREATININE 1.2* 1.1 1.1     Recent Labs     12/30/19  1755 12/31/19  0153 01/02/20  0605   AST 17 15 12*   ALT 7* 6* 6*   BILITOT 0.5 0.3 0.1   ALKPHOS 72 75 67     Recent Labs     12/31/19  1456   INR 0.97     Recent Labs     12/30/19  1755 12/31/19  1456   TROPONINT 0.012* <0.010        Imaging reviewed      Electronically signed by Orly Little MD on 1/2/2020 at 1:13 PM Right post THR/yes

## 2024-11-18 NOTE — DISCHARGE NOTE PROVIDER - CARE PROVIDER_API CALL
Sundar Rodríguez Reed  Orthopaedic Surgery  [address] [place] [id number]  Phone: [phone]  Fax: [phone]  Scheduled Appointment: 12/10/2024 11:00 AM

## 2024-11-18 NOTE — DISCHARGE NOTE NURSING/CASE MANAGEMENT/SOCIAL WORK - NSSCNAMETXT_GEN_ALL_CORE
[facility] care agency [phone] will reach out to you within 24-72 hours of your discharge to schedule home care visit/eval appointment with you. Please call agency for any queries regarding home care services

## 2024-11-18 NOTE — PHYSICAL THERAPY INITIAL EVALUATION ADULT - ADDITIONAL COMMENTS
Lives in house with spouse and MIL.  2 ENRRIQUE with HR; 14 inside with HR.  Has walker and commode. +stall shower.

## 2024-11-18 NOTE — CONSULT NOTE ADULT - ASSESSMENT
69M with HTN, HLD, osteoarthritis who presents electively for right hip surgery.      Right hip surgery for osteoarthritis  - reviewed patient's medical clearance and other outpatient notes  - post-op care as per ortho  - DVT ppx with aspirin 81mg   - pain control with standing tylenol 1000mg, celebrex 200mg BID, oxycodone 5mg and 10mg for mod/severe respectively pain, dilaudid 0.5mg IV for breakthrough pain  - monitor for respiratory depression while on pain meds  - PT/ OT  - advance diet as tolerated  - anti-emetics PRN  - incentive spirometer  - bowel regiment    HTN/HLD  - restart losartan on POD #2  - hold HCTZ  - restart caduet (amlodipine on POD #2, but the atorvastatin can restart now)    Preventive measures  - on ASA 81mg BID

## 2024-11-18 NOTE — DISCHARGE NOTE PROVIDER - NSDCCPCAREPLAN_GEN_ALL_CORE_FT
PRINCIPAL DISCHARGE DIAGNOSIS  Diagnosis: Primary localized osteoarthritis of right hip  Assessment and Plan of Treatment: Physical Therapy /Occupational Therapy for: Ambulation, Transfers , Stairs, ADLs (activities of daily living), isometrics.  TOTAL HIP PRECAUTIONS  *Remember to continue all of the precautions for total hip replacement. Your surgeon will tell you when and if you can move beyond these limitations.  • Do not bend your hip more than 90 degrees   • Do not cross your legs or ankles when laying sitting or standing.  • DO NOT bend over at your waist.  • Avoid sitting in low, soft chairs such as sofas and car seats. You should sit on a chair using firm pillows to raise the height of the seat.  • Make sure your bed level is high, so that you maintain proper leg positioning when sitting on the side, or getting in or out.  • When entering and traveling by car, sit in the front passenger seat. Make sure that the car seat is all the way back and semi-reclined before entering.  • Do not allow your knees to come together when sitting or lying in bed. Use abduction pillow.  • Do not take a tub bath yet.   • Do not resume driving until you have your surgeon’s permission.  Keep incision clean and dry. May shower after surgery if no drainage from incision.  You have a RAJENDRA negative pressure and water resistant dressing over your surgical wound and may shower.  Disconnect RAJENDRA battery prior to showering, reconnect battery after showering and press orange button to resume RAJENDRA power. Remove RAJENDRA dressing 7 days after surgery or when the battery alarms and stops working. If drainage is noted from your wound, apply a dry sterile dressing and call your surgeon.     PRINCIPAL DISCHARGE DIAGNOSIS  Diagnosis: Primary localized osteoarthritis of right hip  Assessment and Plan of Treatment: Physical Therapy /Occupational Therapy for: Ambulation, Transfers , Stairs, ADLs (activities of daily living), isometrics.  Posterior TOTAL HIP PRECAUTIONS  *Remember to continue all of the precautions for total hip replacement. Your surgeon will tell you when and if you can move beyond these limitations.  • Do not bend your hip more than 90 degrees   • Do not cross your legs or ankles when laying sitting or standing.  • DO NOT bend over at your waist.  • Avoid sitting in low, soft chairs such as sofas and car seats. You should sit on a chair using firm     pillows to raise the height of the seat.  • Make sure your bed level is high, so that you maintain proper leg positioning when sitting on     the side, or getting in or out.  • When entering and traveling by car, sit in the front passenger seat. Make sure that the car seat is all the way back and semi-reclined before entering.  • Do not allow your knees to come together when sitting or lying in bed. Use abduction pillow.  • Do not take a tub bath yet. No driving.  - You have a RAJENDRA negative pressure and water resistant dressing over your surgical wound and        may allow to get wet briefly in shower.  Disconnect RAJENDRA battery prior to showering, reconnect      battery after showering and press orange button to resume RAJENDRA power.   - Remove RAJENDRA dressing 7 days after surgery or when the battery alarms and stops working. If      drainage is noted from your wound, apply a dry sterile dressing and call your surgeon.  - Right Ephraim incision may get wet briefly in shower thereafter if no drainage present  - 1st Routine office visit with Surgeon in office as scheduled.  - See your PCP in 2 weeks for exam, blood tests, review of medication.  - Call the Surgeon for new redness or drainage around incision, severe Hip pain, Fall or Right leg      injury

## 2024-11-18 NOTE — DISCHARGE NOTE NURSING/CASE MANAGEMENT/SOCIAL WORK - FINANCIAL ASSISTANCE
[facility] provides services at a reduced cost to those who are determined to be eligible through [facility]’s financial assistance program. Information regarding [facility]’s financial assistance program can be found by going to [email or web address] or by calling [phone].

## 2024-11-18 NOTE — CONSULT NOTE ADULT - REASON FOR ADMISSION
right hip surgery Closure 3 Information: This tab is for additional flaps and grafts above and beyond our usual structured repairs.  Please note if you enter information here it will not currently bill and you will need to add the billing information manually.

## 2024-11-18 NOTE — OCCUPATIONAL THERAPY INITIAL EVALUATION ADULT - LIVES WITH, PROFILE
Pt lives with his wife and mother in law in a private home, 2 steps to enter, 14 steps inside with a stall shower. Pt was independent with ADLs, IADLs, functional mobility/transfers prior to admission without AD. Pt owns a RW and commode./spouse

## 2024-11-18 NOTE — PRE-OP CHECKLIST - VERIFY SURGICAL SITE/SIDE WITH PATIENT
Over the counter medications:  -Start Mucinex twice a day for cough and movement of sputum.  Take with a large glass of water  -Take Ibuprofen 400-600 mg every 4-6 hours or Aleve/naproxen every 12 hours as needed for fever, pain, and inflammation.  Take with food.   -Take Tylenol every 4 hours as needed for additional pain and fever relief.    -Start a probiotic and/or eat yogurt while on antibiotic    Other things to try for a sore throat:   - Teaspoons of honey   -Try saltwater gargles or drinking broth to help with swelling in the back of your throat. Avoid drinking broth if you have any cardiac issues or swelling in your legs.     Ordered from Pharmacy  -Start prednisone twice a day for five days for inflammation. Take with food.  -Start Augmentin twice a day for 10 days    -Continue Albuterol inhaler every 4 hours as needed for SOB, Wheezing, or Cough                   done

## 2024-11-18 NOTE — OCCUPATIONAL THERAPY INITIAL EVALUATION ADULT - ASR WT BEARING STATUS EVAL
You may call insurance and ask about Saxenda.  Work on increasing activity to 30 minutes 4-5 days a week- heart rate needs to be elevated.   Eat small frequent meals throughout the day. Limit red meds.  Add in vegetables and fruits. Try to limit processed foods.   Referral to dietician   Right LE

## 2024-11-18 NOTE — DISCHARGE NOTE PROVIDER - NSDCMRMEDTOKEN_GEN_ALL_CORE_FT
Caduet 10 mg-10 mg oral tablet: 1 tab(s) orally once a day (at bedtime)  losartan-hydroCHLOROthiazide 100 mg-12.5 mg oral tablet: 1 tab(s) orally once a day   acetaminophen 500 mg oral tablet: 2 tab(s) orally every 8 hours  Caduet 10 mg-10 mg oral tablet: 1 tab(s) orally once a day (at bedtime)  Ecotrin Adult Low Strength 81 mg oral delayed release tablet: 1 tab(s) orally every 12 hours Take Starting at 7 AM with Food Each Day  losartan-hydroCHLOROthiazide 100 mg-12.5 mg oral tablet: 1 tab(s) orally once a day  omeprazole 20 mg oral delayed release capsule: 1 cap(s) orally once a day  oxyCODONE 5 mg oral tablet: 1 tab(s) orally every 4 to 6 hours as needed for  moderate pain 2  Tabs EVery 4-6 Hours As Needed For Severe Hip Pain MDD: 5  polyethylene glycol 3350 oral powder for reconstitution: 17 gram(s) orally once a day (at bedtime)  senna leaf extract oral tablet: 2 tab(s) orally once a day (at bedtime)

## 2024-11-18 NOTE — DISCHARGE NOTE PROVIDER - NSDCFUSCHEDAPPT_GEN_ALL_CORE_FT
Sundar Rodríguez  [facility]  [address]  Scheduled Appointment: 11/18/2024    Sundar Rodríguez  [facility]  [address]  Scheduled Appointment: 12/10/2024     Sundar Rodríguez  [facility] Physician [facility]  [address] [facility]  Scheduled Appointment: 12/10/2024

## 2024-11-18 NOTE — CONSULT NOTE ADULT - SUBJECTIVE AND OBJECTIVE BOX
HPI:  69M with HTN, HLD, osteoarthritis who presents electively for right hip surgery.  Has been having long standing hip pain and was affecting him with his activities.  Recommended to have surgery.  Seen in his room.  Currently has minimal pain in the area and only has some tingling in the thigh.  Otherwise, no residual numbness in his feet and is able to move his feet and toes.  No nausea.  Voided.        PAST MEDICAL & SURGICAL HISTORY:  HTN (hypertension)  HLD (hyperlipidemia)  Osteoarthritis of right hip  S/P arthroscopy of right shoulder  Status post laser cataract surgery of both eyes  Macular pucker, bilateral  S/P correction of deviated nasal septum    REVIEW OF SYSTEMS:  CONSTITUTIONAL:    no fever or weight loss or fatigue  EYES:    no eye pain or visual disturbances or discharge  ENMT:     no difficulty hearing or tinnitus or vertigo, no sinus or throat pain  NECK:    no pain or stiffness  RESPIRATORY:    no cough or wheezing or chills or hemoptysis, no shortness of breath  CARDIOVASCULAR:    no chest pain or palpitations or dizziness or leg swelling  GASTROINTESTINAL:    no abdominal or epigastric pain. no nausea or vomiting or hematemesis, no diarrhea or constipation. no melena or hematochezia.  GENITOURINARY:    no dysuria or frequency or hematuria or incontinence  NEUROLOGICAL:    slight right thigh tingling  SKIN:    no itching or burning or rashes or lesions   LYMPH NODES:    no enlarged glands  ENDOCRINE:    no heat or cold intolerance, no hair loss, no polydipsia or polyuria  MUSCULOSKELETAL:    slight right discomfort  PSYCHIATRIC:    no depression or anxiety or mood swings or difficulty sleeping  HEME/LYMPH:    no easy bruising or bleeding gums  ALLERGY AND IMMUNOLOGIC:    no hives or eczema      HOME MEDICATIONS:    · 	losartan-hydroCHLOROthiazide 100 mg-12.5 mg oral tablet: Last Dose Taken:  , 1 tab(s) orally once a day  · 	Caduet 10 mg-10 mg oral tablet: Last Dose Taken:  , 1 tab(s) orally once a day (at bedtime)    ALLERGIES and INTOLERANCES:  No Known Allergies    SOCIAL HISTORY:  - no smoking  - rare etoh use    FAMILY HISTORY:  FHx: heart disease (Father)    PHYSICAL EXAM:  Vital Signs Last 24 Hrs  T(C): 36.4 (18 Nov 2024 13:00), Max: 36.6 (18 Nov 2024 07:35)  T(F): 97.5 (18 Nov 2024 13:00), Max: 97.9 (18 Nov 2024 07:35)  HR: 72 (18 Nov 2024 13:11) (65 - 76)  BP: 120/82 (18 Nov 2024 13:11) (94/73 - 143/86)  BP(mean): --  RR: 19 (18 Nov 2024 13:00) (12 - 19)  SpO2: 94% (18 Nov 2024 13:11) (94% - 96%)    Parameters below as of 18 Nov 2024 12:30  Patient On (Oxygen Delivery Method): room air    GENERAL:     age appropriate, in NAD  HEAD:     atraumatic, normocephalic  EYES:     EOMI, conjunctiva and sclera clear  RESPIRATORY:     clear to auscultation bilaterally, no rales or rhonchi or wheezing or rubs  CARDIOVASCULAR:     regular rate and rhythm, no murmurs or rubs or gallops  GASTROINTESTINAL:     soft, nontender, nondistended, bowel sounds present  EXTREMITIES:     no clubbing or cyanosis or edema  MUSCULOSKELETAL:     slight right hip discomfort  NERVOUS SYSTEM:     moves both feet and wiggles all toes, no sensory deficits noted  PSYCH:     appropriate, alert and orientated x3, good concentration      LABS:                        17.2[H]  9.68  )-----------( 248      ( 05 Nov 2024 14:10 )             49.7     05 Nov 2024 14:10    140    |  102    |  25[H]  ----------------------------<  96     3.6     |  30     |  1.58[H]        Ca    9.2        05 Nov 2024 14:10    TPro  7.3    /  Alb  3.7    /  TBili  0.7    /  DBili  x      /  AST  21     /  ALT  26     /  AlkPhos  112    05 Nov 2024 14:10    LIVER FUNCTIONS - ( 05 Nov 2024 14:10 )  Alb: 3.7 g/dL / Pro: 7.3 g/dL / ALK PHOS: 112 U/L / ALT: 26 U/L / AST: 21 U/L / GGT: x           PT/INR - ( 05 Nov 2024 14:10 )   PT: 11.6 ;   INR: 1.00          PTT - ( 05 Nov 2024 14:10 )  PTT:33.6     EKG:  NSR

## 2024-11-19 VITALS
DIASTOLIC BLOOD PRESSURE: 90 MMHG | HEART RATE: 74 BPM | RESPIRATION RATE: 18 BRPM | TEMPERATURE: 98 F | SYSTOLIC BLOOD PRESSURE: 147 MMHG | OXYGEN SATURATION: 97 %

## 2024-11-19 LAB
ANION GAP SERPL CALC-SCNC: 8 MMOL/L — SIGNIFICANT CHANGE UP (ref 5–17)
BUN SERPL-MCNC: 16 MG/DL — SIGNIFICANT CHANGE UP (ref 7–23)
CALCIUM SERPL-MCNC: 8.9 MG/DL — SIGNIFICANT CHANGE UP (ref 8.4–10.5)
CHLORIDE SERPL-SCNC: 107 MMOL/L — SIGNIFICANT CHANGE UP (ref 96–108)
CO2 SERPL-SCNC: 28 MMOL/L — SIGNIFICANT CHANGE UP (ref 22–31)
CREAT SERPL-MCNC: 1.17 MG/DL — SIGNIFICANT CHANGE UP (ref 0.5–1.3)
EGFR: 68 ML/MIN/1.73M2 — SIGNIFICANT CHANGE UP
GLUCOSE SERPL-MCNC: 138 MG/DL — HIGH (ref 70–99)
HCT VFR BLD CALC: 36.1 % — LOW (ref 39–50)
HGB BLD-MCNC: 12.6 G/DL — LOW (ref 13–17)
MCHC RBC-ENTMCNC: 28 PG — SIGNIFICANT CHANGE UP (ref 27–34)
MCHC RBC-ENTMCNC: 34.9 G/DL — SIGNIFICANT CHANGE UP (ref 32–36)
MCV RBC AUTO: 80.2 FL — SIGNIFICANT CHANGE UP (ref 80–100)
NRBC # BLD: 0 /100 WBCS — SIGNIFICANT CHANGE UP (ref 0–0)
PLATELET # BLD AUTO: 185 K/UL — SIGNIFICANT CHANGE UP (ref 150–400)
POTASSIUM SERPL-MCNC: 4 MMOL/L — SIGNIFICANT CHANGE UP (ref 3.5–5.3)
POTASSIUM SERPL-SCNC: 4 MMOL/L — SIGNIFICANT CHANGE UP (ref 3.5–5.3)
RBC # BLD: 4.5 M/UL — SIGNIFICANT CHANGE UP (ref 4.2–5.8)
RBC # FLD: 14 % — SIGNIFICANT CHANGE UP (ref 10.3–14.5)
SODIUM SERPL-SCNC: 143 MMOL/L — SIGNIFICANT CHANGE UP (ref 135–145)
WBC # BLD: 12.77 K/UL — HIGH (ref 3.8–10.5)
WBC # FLD AUTO: 12.77 K/UL — HIGH (ref 3.8–10.5)

## 2024-11-19 PROCEDURE — 97116 GAIT TRAINING THERAPY: CPT

## 2024-11-19 PROCEDURE — 80048 BASIC METABOLIC PNL TOTAL CA: CPT

## 2024-11-19 PROCEDURE — 97530 THERAPEUTIC ACTIVITIES: CPT

## 2024-11-19 PROCEDURE — C1776: CPT

## 2024-11-19 PROCEDURE — 36415 COLL VENOUS BLD VENIPUNCTURE: CPT

## 2024-11-19 PROCEDURE — 73502 X-RAY EXAM HIP UNI 2-3 VIEWS: CPT

## 2024-11-19 PROCEDURE — 97165 OT EVAL LOW COMPLEX 30 MIN: CPT

## 2024-11-19 PROCEDURE — 85027 COMPLETE CBC AUTOMATED: CPT

## 2024-11-19 PROCEDURE — C1713: CPT

## 2024-11-19 PROCEDURE — 99232 SBSQ HOSP IP/OBS MODERATE 35: CPT

## 2024-11-19 PROCEDURE — 97161 PT EVAL LOW COMPLEX 20 MIN: CPT

## 2024-11-19 RX ORDER — SENNOSIDES 8.6 MG
2 TABLET ORAL
Qty: 0 | Refills: 0 | DISCHARGE
Start: 2024-11-19

## 2024-11-19 RX ORDER — OMEPRAZOLE 20 MG/1
1 CAPSULE, DELAYED RELEASE ORAL
Qty: 30 | Refills: 1
Start: 2024-11-19 | End: 2025-01-17

## 2024-11-19 RX ORDER — ACETAMINOPHEN 500MG 500 MG/1
2 TABLET, COATED ORAL
Qty: 0 | Refills: 0 | DISCHARGE
Start: 2024-11-19

## 2024-11-19 RX ORDER — OXYCODONE HYDROCHLORIDE 30 MG/1
1 TABLET ORAL
Qty: 35 | Refills: 0
Start: 2024-11-19

## 2024-11-19 RX ORDER — POLYETHYLENE GLYCOL 3350 17 G/17G
17 POWDER, FOR SOLUTION ORAL
Qty: 0 | Refills: 0 | DISCHARGE
Start: 2024-11-19

## 2024-11-19 RX ADMIN — ACETAMINOPHEN 500MG 1000 MILLIGRAM(S): 500 TABLET, COATED ORAL at 05:54

## 2024-11-19 RX ADMIN — Medication 81 MILLIGRAM(S): at 05:54

## 2024-11-19 RX ADMIN — PANTOPRAZOLE SODIUM 40 MILLIGRAM(S): 40 TABLET, DELAYED RELEASE ORAL at 06:00

## 2024-11-19 RX ADMIN — Medication 100 MILLIGRAM(S): at 01:10

## 2024-11-19 RX ADMIN — ACETAMINOPHEN 500MG 1000 MILLIGRAM(S): 500 TABLET, COATED ORAL at 06:27

## 2024-11-19 RX ADMIN — Medication 125 MILLILITER(S): at 06:01

## 2024-11-19 RX ADMIN — ACETAMINOPHEN 500MG 1000 MILLIGRAM(S): 500 TABLET, COATED ORAL at 14:49

## 2024-11-19 RX ADMIN — DEXAMETHASONE 101.6 MILLIGRAM(S): 1.5 TABLET ORAL at 05:54

## 2024-11-19 NOTE — PROGRESS NOTE ADULT - SUBJECTIVE AND OBJECTIVE BOX
Discharge medication calendar:  ASA EC 81mg q12h x 4 weeks  Omeprazole 20mg QAM x 4 weeks  no nsaids  APAP 1000mg q8h x 2-3 weeks  Narcotic PRN  Docusate 100mg TID while taking narcotic  Miralax, Senna, or Bisacodyl PRN for treatment of constipation

## 2024-11-19 NOTE — CASE MANAGEMENT PROGRESS NOTE - NSCMPROGRESSNOTE_GEN_ALL_CORE
Per treatment team pt. cleared for home today :  s/p right THR  d/c today with [facility] PT soc 11/20/24  and pt. informed of "PATIENT HAS AN 15% COINS SN 43.20 PT 33.30", pt. willing to copayment.  Hip Kit was paid for by pt. and approved by [employer] rep.  Zenaida Cifuentes for delivery. CM delivered and consignment done - attached in ACM and put in envelope.  States his  Wife Padma will be caregiver and trans[port home/  RAJENDRA dressing, [facility]   d/c notice given/ explained/ signed and pt. given a copy.   CM review home care process and expectations and start of care and offered to contact family and pt. declined.  Pt. in agreement with transition plans as above for today.  CM following.

## 2024-11-19 NOTE — PROGRESS NOTE ADULT - SUBJECTIVE AND OBJECTIVE BOX
Patient is a 69y old  Male who presents with a chief complaint of right hip surgery (18 Nov 2024 14:41)      INTERVAL HPI/OVERNIGHT EVENTS:  No major acute events overnight.  This AM, patient seen in room.  Lying in bed and resting.  Has some discomfort but controlled by pain medications.  No new numbness or weakness noted.  No nausea.  No other complaints.      MEDICATIONS  (STANDING):  acetaminophen     Tablet .. 1000 milliGRAM(s) Oral every 8 hours  aspirin enteric coated 81 milliGRAM(s) Oral every 12 hours  atorvastatin 10 milliGRAM(s) Oral at bedtime  lactated ringers. 1000 milliLiter(s) (125 mL/Hr) IV Continuous <Continuous>  pantoprazole    Tablet 40 milliGRAM(s) Oral before breakfast  polyethylene glycol 3350 17 Gram(s) Oral at bedtime  senna 2 Tablet(s) Oral at bedtime    MEDICATIONS  (PRN):  aluminum hydroxide/magnesium hydroxide/simethicone Suspension 30 milliLiter(s) Oral four times a day PRN Indigestion  HYDROmorphone  Injectable 0.5 milliGRAM(s) IV Push every 3 hours PRN Breakthrough pain  magnesium hydroxide Suspension 30 milliLiter(s) Oral daily PRN Constipation  ondansetron Injectable 4 milliGRAM(s) IV Push every 6 hours PRN Nausea and/or Vomiting  oxyCODONE    IR 5 milliGRAM(s) Oral every 3 hours PRN Moderate Pain (4 - 6)  oxyCODONE    IR 10 milliGRAM(s) Oral every 3 hours PRN Severe Pain (7 - 10)      Allergies  No Known Allergies    ROS as above and reviewed and is otherwise negative    PHYSICAL EXAM:  Vital Signs Last 24 Hrs  T(C): 36.7 (19 Nov 2024 07:56), Max: 36.8 (18 Nov 2024 23:30)  T(F): 98.1 (19 Nov 2024 07:56), Max: 98.3 (18 Nov 2024 23:30)  HR: 74 (19 Nov 2024 07:56) (65 - 80)  BP: 147/90 (19 Nov 2024 07:56) (94/73 - 147/90)  BP(mean): --  RR: 18 (19 Nov 2024 07:56) (12 - 19)  SpO2: 97% (19 Nov 2024 07:56) (94% - 97%)    Parameters below as of 19 Nov 2024 07:56  Patient On (Oxygen Delivery Method): room air      GENERAL:     in NAD  HEAD:     atraumatic, normocephalic  EYES:     EOMI, conjunctiva and sclera clear  RESPIRATORY:     clear to auscultation bilaterally, no rales or rhonchi or wheezing or rubs  CARDIOVASCULAR:     regular rate and rhythm, no murmurs or rubs or gallops  GASTROINTESTINAL:     soft, nontender, nondistended, bowel sounds present  EXTREMITIES:     no clubbing or cyanosis or edema  MUSCULOSKELETAL:     slight right hip discomfort  NERVOUS SYSTEM:     moves both feet and wiggles all toes, no sensory deficits noted  PSYCH:     appropriate, alert and orientated x3, good concentration      LABS:                        12.6   12.77 )-----------( 185      ( 19 Nov 2024 06:00 )             36.1     19 Nov 2024 06:00    143    |  107    |  16     ----------------------------<  138    4.0     |  28     |  1.17     Ca    8.9        19 Nov 2024 06:00        Urinalysis Basic - ( 19 Nov 2024 06:00 )    Color: x / Appearance: x / SG: x / pH: x  Gluc: 138 mg/dL / Ketone: x  / Bili: x / Urobili: x   Blood: x / Protein: x / Nitrite: x   Leuk Esterase: x / RBC: x / WBC x   Sq Epi: x / Non Sq Epi: x / Bacteria: x      CAPILLARY BLOOD GLUCOSE

## 2024-11-19 NOTE — PROGRESS NOTE ADULT - ASSESSMENT
69M with HTN, HLD, osteoarthritis who presents electively for right hip surgery.      Right hip surgery for osteoarthritis  - medically stable for DC  - post-op care as per ortho  - DVT ppx with aspirin 81mg   - pain control with standing tylenol 1000mg, celebrex 200mg BID, oxycodone 5mg and 10mg for mod/severe respectively pain, dilaudid 0.5mg IV for breakthrough pain  - monitor for respiratory depression while on pain meds  - PT/ OT  - advance diet as tolerated  - anti-emetics PRN  - incentive spirometer  - bowel regiment    Elevated Cr - had elevated Cr pre-op at 1.58  - currently today 1.17  - stable, no interventions needed at this time    HTN/HLD  - restart losartan on POD #2  - hold HCTZ  - restart caduet (amlodipine on POD #2, but the atorvastatin can restart now)    Preventive measures  - on ASA 81mg BID

## 2024-11-22 ENCOUNTER — APPOINTMENT (OUTPATIENT)
Dept: ORTHOPEDIC SURGERY | Facility: CLINIC | Age: 69
End: 2024-11-22

## 2024-12-10 ENCOUNTER — RESULT CHARGE (OUTPATIENT)
Age: 69
End: 2024-12-10

## 2024-12-10 ENCOUNTER — APPOINTMENT (OUTPATIENT)
Dept: ORTHOPEDIC SURGERY | Facility: CLINIC | Age: 69
End: 2024-12-10
Payer: COMMERCIAL

## 2024-12-10 VITALS — BODY MASS INDEX: 28.32 KG/M2 | WEIGHT: 170 LBS | HEIGHT: 65 IN

## 2024-12-10 DIAGNOSIS — Z96.641 PRESENCE OF RIGHT ARTIFICIAL HIP JOINT: ICD-10-CM

## 2024-12-10 PROBLEM — E78.5 HYPERLIPIDEMIA, UNSPECIFIED: Chronic | Status: ACTIVE | Noted: 2024-11-05

## 2024-12-10 PROBLEM — I10 ESSENTIAL (PRIMARY) HYPERTENSION: Chronic | Status: ACTIVE | Noted: 2024-11-05

## 2024-12-10 PROBLEM — M16.11 UNILATERAL PRIMARY OSTEOARTHRITIS, RIGHT HIP: Chronic | Status: ACTIVE | Noted: 2024-11-05

## 2024-12-10 PROCEDURE — 99024 POSTOP FOLLOW-UP VISIT: CPT

## 2024-12-10 PROCEDURE — 73503 X-RAY EXAM HIP UNI 4/> VIEWS: CPT | Mod: RT

## 2025-03-11 ENCOUNTER — RESULT CHARGE (OUTPATIENT)
Age: 70
End: 2025-03-11

## 2025-03-11 ENCOUNTER — APPOINTMENT (OUTPATIENT)
Dept: ORTHOPEDIC SURGERY | Facility: CLINIC | Age: 70
End: 2025-03-11
Payer: COMMERCIAL

## 2025-03-11 DIAGNOSIS — Z96.641 PRESENCE OF RIGHT ARTIFICIAL HIP JOINT: ICD-10-CM

## 2025-03-11 PROCEDURE — 73503 X-RAY EXAM HIP UNI 4/> VIEWS: CPT | Mod: RT

## 2025-03-11 PROCEDURE — G2211 COMPLEX E/M VISIT ADD ON: CPT | Mod: NC

## 2025-03-11 PROCEDURE — 99214 OFFICE O/P EST MOD 30 MIN: CPT

## (undated) DEVICE — SPHERE STERILE

## (undated) DEVICE — PACK TOTAL HIP

## (undated) DEVICE — SUT VICRYL 3-0 36" CT-1

## (undated) DEVICE — PLASTIC SOLUTION BOWL 160Z

## (undated) DEVICE — SUT STRATAFIX SPIRAL MONOCRYL PLUS 4-0 30CM PS-2 UNDYED

## (undated) DEVICE — DRSG MEPILEX 10 X 10CM (4 X 4") AG

## (undated) DEVICE — ONETRAC LIGHTED RETRACTOR LXS CROWN TIP DISP

## (undated) DEVICE — SUT VICRYL 2-0 36" CT-1 UNDYED

## (undated) DEVICE — GOWN IMPERV BREATHABLE XL

## (undated) DEVICE — HOOD T5 PEELAWAY

## (undated) DEVICE — NDL HYPO SAFE 20G X 1.5" (YELLOW)

## (undated) DEVICE — SUT VICRYL 1 36" CTX UNDYED

## (undated) DEVICE — POSITIONER FOAM ABDUCTION PILLOW MED (PINK)

## (undated) DEVICE — DRAPE 3/4 SHEET 52X76"

## (undated) DEVICE — BLADE SURGICAL #11 CARBON

## (undated) DEVICE — PREP CHLORAPREP HI-LITE ORANGE 26ML

## (undated) DEVICE — DRAPE HIP W POUCHES 87X115X134"

## (undated) DEVICE — SUT MONOCRYL 3-0 27" PS-2 UNDYED

## (undated) DEVICE — ZIMMER PULSAVAC PLUS FAN KIT

## (undated) DEVICE — DRAPE GYN IRRIGATION POUCH 19 X 23"

## (undated) DEVICE — ELCTR STRYKER NEPTUNE SMOKE EVACUATION PENCIL (GREEN)

## (undated) DEVICE — ELCTR BOVIE TIP BLADE INSULATED 6.5" EDGE

## (undated) DEVICE — Device

## (undated) DEVICE — HOOD FLYTE STRYKER HELMET SHIELD

## (undated) DEVICE — SUT ETHIBOND 5 4-30" V-37

## (undated) DEVICE — SYR LUER LOK 20CC

## (undated) DEVICE — DRAPE IOBAN 33" X 23"

## (undated) DEVICE — DRSG PICO NPWT 4X12"

## (undated) DEVICE — DRAPE CAMERA MINI 3D

## (undated) DEVICE — DRAPE 1/2 SHEET 40X57"

## (undated) DEVICE — DRSG STOCKINETTE CLOTH 6 X 72"

## (undated) DEVICE — SAW BLADE STRYKER WIDE MED 25MM X 73MM X 0.89MM

## (undated) DEVICE — DRAPE STERI-DRAPE INCISE 32X33"

## (undated) DEVICE — SOL IRR BAG NS 0.9% 3000ML